# Patient Record
Sex: FEMALE | Race: WHITE | NOT HISPANIC OR LATINO | Employment: STUDENT | URBAN - METROPOLITAN AREA
[De-identification: names, ages, dates, MRNs, and addresses within clinical notes are randomized per-mention and may not be internally consistent; named-entity substitution may affect disease eponyms.]

---

## 2023-02-14 PROBLEM — J03.00 ACUTE STREPTOCOCCAL TONSILLITIS: Status: ACTIVE | Noted: 2023-02-14

## 2023-02-14 PROBLEM — N92.1 METRORRHAGIA: Status: ACTIVE | Noted: 2023-02-14

## 2023-02-14 PROBLEM — K12.0 APHTHOUS ULCER: Status: ACTIVE | Noted: 2023-02-14

## 2023-02-14 PROBLEM — N39.0 ACUTE LOWER UTI: Status: ACTIVE | Noted: 2023-02-14

## 2023-02-14 PROBLEM — J45.909 RAD (REACTIVE AIRWAY DISEASE) (HHS-HCC): Status: ACTIVE | Noted: 2023-02-14

## 2023-02-14 PROBLEM — K12.0 CANKER SORES ORAL: Status: ACTIVE | Noted: 2023-02-14

## 2023-02-14 PROBLEM — L70.9 ACNE: Status: ACTIVE | Noted: 2023-02-14

## 2023-02-14 PROBLEM — J32.9 SINUSITIS: Status: ACTIVE | Noted: 2023-02-14

## 2023-02-14 PROBLEM — J45.909 ASTHMA (HHS-HCC): Status: ACTIVE | Noted: 2023-02-14

## 2023-02-14 PROBLEM — R30.0 DYSURIA: Status: ACTIVE | Noted: 2023-02-14

## 2023-02-14 PROBLEM — J06.9 UPPER RESPIRATORY INFECTION: Status: ACTIVE | Noted: 2023-02-14

## 2023-02-14 PROBLEM — N39.0 LOWER URINARY TRACT INFECTIOUS DISEASE: Status: ACTIVE | Noted: 2023-02-14

## 2023-02-14 PROBLEM — F41.8 DEPRESSION WITH ANXIETY: Status: ACTIVE | Noted: 2023-02-14

## 2023-02-14 PROBLEM — B00.9 HERPES SIMPLEX: Status: ACTIVE | Noted: 2023-02-14

## 2023-02-14 PROBLEM — J02.9 SORETHROAT: Status: ACTIVE | Noted: 2023-02-14

## 2023-02-14 PROBLEM — J01.90 ACUTE SINUSITIS: Status: ACTIVE | Noted: 2023-02-14

## 2023-02-14 PROBLEM — L74.510 HYPERHIDROSIS OF AXILLA: Status: ACTIVE | Noted: 2023-02-14

## 2023-02-14 PROBLEM — J02.9 ACUTE PHARYNGITIS: Status: ACTIVE | Noted: 2023-02-14

## 2023-02-14 PROBLEM — T78.40XA ALLERGIC REACTION: Status: ACTIVE | Noted: 2023-02-14

## 2023-02-14 PROBLEM — R05.9 COUGH: Status: ACTIVE | Noted: 2023-02-14

## 2023-02-15 RX ORDER — METHYLPREDNISOLONE 4 MG/1
TABLET ORAL
COMMUNITY
Start: 2017-12-12 | End: 2023-03-28 | Stop reason: ALTCHOICE

## 2023-02-15 RX ORDER — VALACYCLOVIR HYDROCHLORIDE 1 G/1
1 TABLET, FILM COATED ORAL DAILY
COMMUNITY
Start: 2013-02-04 | End: 2023-03-28 | Stop reason: ALTCHOICE

## 2023-02-15 RX ORDER — ALBUTEROL SULFATE 90 UG/1
2 AEROSOL, METERED RESPIRATORY (INHALATION) EVERY 6 HOURS PRN
COMMUNITY
Start: 2020-03-03 | End: 2023-03-28 | Stop reason: ALTCHOICE

## 2023-02-15 RX ORDER — AMOXICILLIN 875 MG/1
1 TABLET, FILM COATED ORAL DAILY
COMMUNITY
Start: 2017-01-26 | End: 2023-03-28 | Stop reason: ALTCHOICE

## 2023-02-15 RX ORDER — ALBUTEROL SULFATE 90 UG/1
2 AEROSOL, METERED RESPIRATORY (INHALATION) EVERY 4 HOURS PRN
COMMUNITY
Start: 2017-12-12 | End: 2023-03-28 | Stop reason: ALTCHOICE

## 2023-02-15 RX ORDER — TRIAMCINOLONE ACETONIDE 1 MG/G
PASTE DENTAL 3 TIMES DAILY
COMMUNITY
Start: 2022-06-24 | End: 2023-03-28 | Stop reason: ALTCHOICE

## 2023-02-15 RX ORDER — LISDEXAMFETAMINE DIMESYLATE 70 MG/1
CAPSULE ORAL
COMMUNITY
Start: 2014-08-19

## 2023-02-15 RX ORDER — HYDROXYZINE PAMOATE 50 MG/1
1 CAPSULE ORAL NIGHTLY
COMMUNITY
Start: 2014-12-16

## 2023-02-15 RX ORDER — ALBUTEROL SULFATE 90 UG/1
2 AEROSOL, METERED RESPIRATORY (INHALATION) EVERY 6 HOURS PRN
COMMUNITY
Start: 2015-05-04 | End: 2023-03-28 | Stop reason: ALTCHOICE

## 2023-02-15 RX ORDER — DEXTROAMPHETAMINE SACCHARATE, AMPHETAMINE ASPARTATE, DEXTROAMPHETAMINE SULFATE AND AMPHETAMINE SULFATE 5; 5; 5; 5 MG/1; MG/1; MG/1; MG/1
TABLET ORAL
COMMUNITY
Start: 2014-05-08 | End: 2023-03-28 | Stop reason: ALTCHOICE

## 2023-02-15 RX ORDER — NITROFURANTOIN 25; 75 MG/1; MG/1
1 CAPSULE ORAL EVERY 12 HOURS
COMMUNITY
Start: 2017-02-27 | End: 2023-03-28 | Stop reason: ALTCHOICE

## 2023-02-15 RX ORDER — ALUMINUM CHLORIDE 20 %
SOLUTION, NON-ORAL TOPICAL AS NEEDED
COMMUNITY
Start: 2014-08-18 | End: 2023-03-28 | Stop reason: ALTCHOICE

## 2023-02-15 RX ORDER — AMOXICILLIN AND CLAVULANATE POTASSIUM 875; 125 MG/1; MG/1
1 TABLET, FILM COATED ORAL EVERY 12 HOURS
COMMUNITY
Start: 2017-12-12 | End: 2023-03-28 | Stop reason: ALTCHOICE

## 2023-03-28 ENCOUNTER — OFFICE VISIT (OUTPATIENT)
Dept: PRIMARY CARE | Facility: CLINIC | Age: 33
End: 2023-03-28
Payer: COMMERCIAL

## 2023-03-28 VITALS
WEIGHT: 140 LBS | HEART RATE: 114 BPM | HEIGHT: 67 IN | DIASTOLIC BLOOD PRESSURE: 81 MMHG | BODY MASS INDEX: 21.97 KG/M2 | TEMPERATURE: 98.4 F | SYSTOLIC BLOOD PRESSURE: 123 MMHG

## 2023-03-28 DIAGNOSIS — Z13.6 SCREENING FOR CARDIOVASCULAR CONDITION: ICD-10-CM

## 2023-03-28 DIAGNOSIS — J30.2 SEASONAL ALLERGIES: Primary | ICD-10-CM

## 2023-03-28 DIAGNOSIS — Z12.4 SCREENING FOR CERVICAL CANCER: ICD-10-CM

## 2023-03-28 DIAGNOSIS — Z72.0 TOBACCO USE: ICD-10-CM

## 2023-03-28 DIAGNOSIS — Z13.21 ENCOUNTER FOR VITAMIN DEFICIENCY SCREENING: ICD-10-CM

## 2023-03-28 PROBLEM — L70.9 ACNE: Status: RESOLVED | Noted: 2023-02-14 | Resolved: 2023-03-28

## 2023-03-28 PROBLEM — J02.9 SORETHROAT: Status: RESOLVED | Noted: 2023-02-14 | Resolved: 2023-03-28

## 2023-03-28 PROBLEM — T78.40XA ALLERGIC REACTION: Status: RESOLVED | Noted: 2023-02-14 | Resolved: 2023-03-28

## 2023-03-28 PROBLEM — R30.0 DYSURIA: Status: RESOLVED | Noted: 2023-02-14 | Resolved: 2023-03-28

## 2023-03-28 PROBLEM — N39.0 ACUTE LOWER UTI: Status: RESOLVED | Noted: 2023-02-14 | Resolved: 2023-03-28

## 2023-03-28 PROBLEM — J03.00 ACUTE STREPTOCOCCAL TONSILLITIS: Status: RESOLVED | Noted: 2023-02-14 | Resolved: 2023-03-28

## 2023-03-28 PROBLEM — F90.2 ADHD (ATTENTION DEFICIT HYPERACTIVITY DISORDER), COMBINED TYPE: Status: ACTIVE | Noted: 2023-03-28

## 2023-03-28 PROBLEM — K12.0 CANKER SORES ORAL: Status: RESOLVED | Noted: 2023-02-14 | Resolved: 2023-03-28

## 2023-03-28 PROBLEM — K12.0 APHTHOUS ULCER: Status: RESOLVED | Noted: 2023-02-14 | Resolved: 2023-03-28

## 2023-03-28 PROBLEM — J02.9 ACUTE PHARYNGITIS: Status: RESOLVED | Noted: 2023-02-14 | Resolved: 2023-03-28

## 2023-03-28 PROBLEM — R05.9 COUGH: Status: RESOLVED | Noted: 2023-02-14 | Resolved: 2023-03-28

## 2023-03-28 PROBLEM — N39.0 LOWER URINARY TRACT INFECTIOUS DISEASE: Status: RESOLVED | Noted: 2023-02-14 | Resolved: 2023-03-28

## 2023-03-28 PROBLEM — J01.90 ACUTE SINUSITIS: Status: RESOLVED | Noted: 2023-02-14 | Resolved: 2023-03-28

## 2023-03-28 PROCEDURE — 4004F PT TOBACCO SCREEN RCVD TLK: CPT | Performed by: INTERNAL MEDICINE

## 2023-03-28 PROCEDURE — 99203 OFFICE O/P NEW LOW 30 MIN: CPT | Performed by: INTERNAL MEDICINE

## 2023-03-28 ASSESSMENT — ENCOUNTER SYMPTOMS
POLYDIPSIA: 0
CHILLS: 0
COUGH: 0
FEVER: 0
SHORTNESS OF BREATH: 0
PALPITATIONS: 0

## 2023-05-08 ENCOUNTER — LAB (OUTPATIENT)
Dept: LAB | Facility: LAB | Age: 33
End: 2023-05-08
Payer: COMMERCIAL

## 2023-05-08 DIAGNOSIS — Z13.21 ENCOUNTER FOR VITAMIN DEFICIENCY SCREENING: ICD-10-CM

## 2023-05-08 DIAGNOSIS — Z13.6 SCREENING FOR CARDIOVASCULAR CONDITION: ICD-10-CM

## 2023-05-08 LAB
ALANINE AMINOTRANSFERASE (SGPT) (U/L) IN SER/PLAS: 12 U/L (ref 7–45)
ALBUMIN (G/DL) IN SER/PLAS: 4.5 G/DL (ref 3.4–5)
ALKALINE PHOSPHATASE (U/L) IN SER/PLAS: 58 U/L (ref 33–110)
ANION GAP IN SER/PLAS: 11 MMOL/L (ref 10–20)
ASPARTATE AMINOTRANSFERASE (SGOT) (U/L) IN SER/PLAS: 15 U/L (ref 9–39)
BILIRUBIN TOTAL (MG/DL) IN SER/PLAS: 0.5 MG/DL (ref 0–1.2)
CALCIDIOL (25 OH VITAMIN D3) (NG/ML) IN SER/PLAS: 27 NG/ML
CALCIUM (MG/DL) IN SER/PLAS: 9.8 MG/DL (ref 8.6–10.6)
CARBON DIOXIDE, TOTAL (MMOL/L) IN SER/PLAS: 29 MMOL/L (ref 21–32)
CHLORIDE (MMOL/L) IN SER/PLAS: 104 MMOL/L (ref 98–107)
CHOLESTEROL (MG/DL) IN SER/PLAS: 145 MG/DL (ref 0–199)
CHOLESTEROL IN HDL (MG/DL) IN SER/PLAS: 57.9 MG/DL
CHOLESTEROL/HDL RATIO: 2.5
CREATININE (MG/DL) IN SER/PLAS: 0.91 MG/DL (ref 0.5–1.05)
ERYTHROCYTE DISTRIBUTION WIDTH (RATIO) BY AUTOMATED COUNT: 13.1 % (ref 11.5–14.5)
ERYTHROCYTE MEAN CORPUSCULAR HEMOGLOBIN CONCENTRATION (G/DL) BY AUTOMATED: 32.6 G/DL (ref 32–36)
ERYTHROCYTE MEAN CORPUSCULAR VOLUME (FL) BY AUTOMATED COUNT: 95 FL (ref 80–100)
ERYTHROCYTES (10*6/UL) IN BLOOD BY AUTOMATED COUNT: 4.51 X10E12/L (ref 4–5.2)
GFR FEMALE: 86 ML/MIN/1.73M2
GLUCOSE (MG/DL) IN SER/PLAS: 88 MG/DL (ref 74–99)
HEMATOCRIT (%) IN BLOOD BY AUTOMATED COUNT: 42.9 % (ref 36–46)
HEMOGLOBIN (G/DL) IN BLOOD: 14 G/DL (ref 12–16)
LDL: 76 MG/DL (ref 0–99)
LEUKOCYTES (10*3/UL) IN BLOOD BY AUTOMATED COUNT: 7.3 X10E9/L (ref 4.4–11.3)
NRBC (PER 100 WBCS) BY AUTOMATED COUNT: 0 /100 WBC (ref 0–0)
PLATELETS (10*3/UL) IN BLOOD AUTOMATED COUNT: 296 X10E9/L (ref 150–450)
POTASSIUM (MMOL/L) IN SER/PLAS: 4.8 MMOL/L (ref 3.5–5.3)
PROTEIN TOTAL: 6.9 G/DL (ref 6.4–8.2)
SODIUM (MMOL/L) IN SER/PLAS: 139 MMOL/L (ref 136–145)
TRIGLYCERIDE (MG/DL) IN SER/PLAS: 54 MG/DL (ref 0–149)
UREA NITROGEN (MG/DL) IN SER/PLAS: 12 MG/DL (ref 6–23)
VLDL: 11 MG/DL (ref 0–40)

## 2023-05-08 PROCEDURE — 80061 LIPID PANEL: CPT

## 2023-05-08 PROCEDURE — 85027 COMPLETE CBC AUTOMATED: CPT

## 2023-05-08 PROCEDURE — 80053 COMPREHEN METABOLIC PANEL: CPT

## 2023-05-08 PROCEDURE — 36415 COLL VENOUS BLD VENIPUNCTURE: CPT

## 2023-05-08 PROCEDURE — 82306 VITAMIN D 25 HYDROXY: CPT

## 2024-04-30 ENCOUNTER — HOSPITAL ENCOUNTER (EMERGENCY)
Facility: HOSPITAL | Age: 34
Discharge: HOME | End: 2024-04-30
Payer: COMMERCIAL

## 2024-04-30 ENCOUNTER — APPOINTMENT (OUTPATIENT)
Dept: RADIOLOGY | Facility: HOSPITAL | Age: 34
End: 2024-04-30
Payer: COMMERCIAL

## 2024-04-30 VITALS
RESPIRATION RATE: 20 BRPM | WEIGHT: 170 LBS | BODY MASS INDEX: 28.32 KG/M2 | SYSTOLIC BLOOD PRESSURE: 118 MMHG | DIASTOLIC BLOOD PRESSURE: 69 MMHG | HEIGHT: 65 IN | OXYGEN SATURATION: 100 % | TEMPERATURE: 98.2 F | HEART RATE: 65 BPM

## 2024-04-30 DIAGNOSIS — O20.0 THREATENED ABORTION (HHS-HCC): Primary | ICD-10-CM

## 2024-04-30 LAB
ABO GROUP (TYPE) IN BLOOD: NORMAL
ALBUMIN SERPL-MCNC: 4.7 G/DL (ref 3.5–5)
ALP BLD-CCNC: 79 U/L (ref 35–125)
ALT SERPL-CCNC: 17 U/L (ref 5–40)
ANION GAP SERPL CALC-SCNC: 11 MMOL/L
APPEARANCE UR: ABNORMAL
AST SERPL-CCNC: 17 U/L (ref 5–40)
BASOPHILS # BLD AUTO: 0.03 X10*3/UL (ref 0–0.1)
BASOPHILS NFR BLD AUTO: 0.3 %
BILIRUB SERPL-MCNC: 0.2 MG/DL (ref 0.1–1.2)
BILIRUB UR STRIP.AUTO-MCNC: NEGATIVE MG/DL
BUN SERPL-MCNC: 13 MG/DL (ref 8–25)
CALCIUM SERPL-MCNC: 9.4 MG/DL (ref 8.5–10.4)
CHLORIDE SERPL-SCNC: 101 MMOL/L (ref 97–107)
CO2 SERPL-SCNC: 27 MMOL/L (ref 24–31)
COLOR UR: ABNORMAL
CREAT SERPL-MCNC: 0.9 MG/DL (ref 0.4–1.6)
EGFRCR SERPLBLD CKD-EPI 2021: 87 ML/MIN/1.73M*2
EOSINOPHIL # BLD AUTO: 0.06 X10*3/UL (ref 0–0.7)
EOSINOPHIL NFR BLD AUTO: 0.7 %
ERYTHROCYTE [DISTWIDTH] IN BLOOD BY AUTOMATED COUNT: 13.2 % (ref 11.5–14.5)
GLUCOSE SERPL-MCNC: 122 MG/DL (ref 65–99)
GLUCOSE UR STRIP.AUTO-MCNC: NORMAL MG/DL
HCG SERPL-ACNC: 213 MIU/ML
HCT VFR BLD AUTO: 41.6 % (ref 36–46)
HGB BLD-MCNC: 13.8 G/DL (ref 12–16)
IMM GRANULOCYTES # BLD AUTO: 0.03 X10*3/UL (ref 0–0.7)
IMM GRANULOCYTES NFR BLD AUTO: 0.3 % (ref 0–0.9)
KETONES UR STRIP.AUTO-MCNC: NEGATIVE MG/DL
LEUKOCYTE ESTERASE UR QL STRIP.AUTO: ABNORMAL
LYMPHOCYTES # BLD AUTO: 2.22 X10*3/UL (ref 1.2–4.8)
LYMPHOCYTES NFR BLD AUTO: 24.4 %
MCH RBC QN AUTO: 31.2 PG (ref 26–34)
MCHC RBC AUTO-ENTMCNC: 33.2 G/DL (ref 32–36)
MCV RBC AUTO: 94 FL (ref 80–100)
MONOCYTES # BLD AUTO: 0.71 X10*3/UL (ref 0.1–1)
MONOCYTES NFR BLD AUTO: 7.8 %
NEUTROPHILS # BLD AUTO: 6.03 X10*3/UL (ref 1.2–7.7)
NEUTROPHILS NFR BLD AUTO: 66.5 %
NITRITE UR QL STRIP.AUTO: NEGATIVE
NRBC BLD-RTO: 0 /100 WBCS (ref 0–0)
PH UR STRIP.AUTO: 5.5 [PH]
PLATELET # BLD AUTO: 308 X10*3/UL (ref 150–450)
POTASSIUM SERPL-SCNC: 4.1 MMOL/L (ref 3.4–5.1)
PROT SERPL-MCNC: 7.5 G/DL (ref 5.9–7.9)
PROT UR STRIP.AUTO-MCNC: ABNORMAL MG/DL
RBC # BLD AUTO: 4.42 X10*6/UL (ref 4–5.2)
RBC # UR STRIP.AUTO: ABNORMAL /UL
RBC #/AREA URNS AUTO: >20 /HPF
RH FACTOR (ANTIGEN D): NORMAL
SODIUM SERPL-SCNC: 139 MMOL/L (ref 133–145)
SP GR UR STRIP.AUTO: 1.02
UROBILINOGEN UR STRIP.AUTO-MCNC: NORMAL MG/DL
WBC # BLD AUTO: 9.1 X10*3/UL (ref 4.4–11.3)
WBC #/AREA URNS AUTO: ABNORMAL /HPF

## 2024-04-30 PROCEDURE — 80053 COMPREHEN METABOLIC PANEL: CPT | Performed by: NURSE PRACTITIONER

## 2024-04-30 PROCEDURE — 76817 TRANSVAGINAL US OBSTETRIC: CPT

## 2024-04-30 PROCEDURE — 76817 TRANSVAGINAL US OBSTETRIC: CPT | Performed by: RADIOLOGY

## 2024-04-30 PROCEDURE — 36415 COLL VENOUS BLD VENIPUNCTURE: CPT | Performed by: NURSE PRACTITIONER

## 2024-04-30 PROCEDURE — 85025 COMPLETE CBC W/AUTO DIFF WBC: CPT | Performed by: NURSE PRACTITIONER

## 2024-04-30 PROCEDURE — 84702 CHORIONIC GONADOTROPIN TEST: CPT | Performed by: NURSE PRACTITIONER

## 2024-04-30 PROCEDURE — 76801 OB US < 14 WKS SINGLE FETUS: CPT

## 2024-04-30 PROCEDURE — 81001 URINALYSIS AUTO W/SCOPE: CPT | Performed by: NURSE PRACTITIONER

## 2024-04-30 PROCEDURE — 76815 OB US LIMITED FETUS(S): CPT | Performed by: RADIOLOGY

## 2024-04-30 PROCEDURE — 86901 BLOOD TYPING SEROLOGIC RH(D): CPT | Performed by: NURSE PRACTITIONER

## 2024-04-30 PROCEDURE — 99284 EMERGENCY DEPT VISIT MOD MDM: CPT | Mod: 25

## 2024-04-30 ASSESSMENT — LIFESTYLE VARIABLES
EVER FELT BAD OR GUILTY ABOUT YOUR DRINKING: NO
EVER HAD A DRINK FIRST THING IN THE MORNING TO STEADY YOUR NERVES TO GET RID OF A HANGOVER: NO
TOTAL SCORE: 0
HAVE YOU EVER FELT YOU SHOULD CUT DOWN ON YOUR DRINKING: NO
HAVE PEOPLE ANNOYED YOU BY CRITICIZING YOUR DRINKING: NO

## 2024-04-30 ASSESSMENT — PAIN - FUNCTIONAL ASSESSMENT
PAIN_FUNCTIONAL_ASSESSMENT: 0-10
PAIN_FUNCTIONAL_ASSESSMENT: 0-10

## 2024-04-30 ASSESSMENT — COLUMBIA-SUICIDE SEVERITY RATING SCALE - C-SSRS
2. HAVE YOU ACTUALLY HAD ANY THOUGHTS OF KILLING YOURSELF?: NO
6. HAVE YOU EVER DONE ANYTHING, STARTED TO DO ANYTHING, OR PREPARED TO DO ANYTHING TO END YOUR LIFE?: NO
1. IN THE PAST MONTH, HAVE YOU WISHED YOU WERE DEAD OR WISHED YOU COULD GO TO SLEEP AND NOT WAKE UP?: NO

## 2024-04-30 ASSESSMENT — PAIN SCALES - GENERAL
PAINLEVEL_OUTOF10: 3
PAINLEVEL_OUTOF10: 2

## 2024-04-30 ASSESSMENT — PAIN DESCRIPTION - PROGRESSION: CLINICAL_PROGRESSION: RAPIDLY IMPROVING

## 2024-04-30 ASSESSMENT — PAIN DESCRIPTION - LOCATION: LOCATION: ABDOMEN

## 2024-04-30 ASSESSMENT — PAIN DESCRIPTION - ONSET: ONSET: ONGOING

## 2024-04-30 ASSESSMENT — PAIN DESCRIPTION - DESCRIPTORS: DESCRIPTORS: CRAMPING

## 2024-04-30 ASSESSMENT — PAIN DESCRIPTION - PAIN TYPE: TYPE: ACUTE PAIN

## 2024-04-30 NOTE — ED PROVIDER NOTES
HPI   Chief Complaint   Patient presents with    Vaginal Bleeding     Low abd pain       HPI  See my MDM                  [place] Coma Scale Score: 15                     Patient History   Past Medical History:   Diagnosis Date    Other conditions influencing health status     Urinary Tract Infection    Other conditions influencing health status     Urinary Tract Infection    Personal history of other diseases of the musculoskeletal system and connective tissue     History of low back pain     No past surgical history on file.  Family History   Problem Relation Name Age of Onset    Other (small cell lung cancer) Mother      Depression Father      Cancer Other Grandmother     Breast cancer Other Aunt     Other (cardiac disorder) Other Grandfather     Heart attack Other Grandfather     Colon cancer Neg Hx       Social History     Tobacco Use    Smoking status: Every Day     Types: Cigarettes     Passive exposure: Never    Smokeless tobacco: Never    Tobacco comments:     Pre-contemplative.    Substance Use Topics    Alcohol use: Yes     Comment: ocassionally    Drug use: Never       Physical Exam   ED Triage Vitals [04/30/24 1507]   Temperature Heart Rate Respirations BP   36.8 °C (98.2 °F) 94 18 157/82      Pulse Ox Temp src Heart Rate Source Patient Position   (!) 10 % -- -- --      BP Location FiO2 (%)     -- --       Physical Exam  CONSTITUTIONAL: Vital signs reviewed as charted, well-developed and in no distress  Eyes: Extraocular muscles are intact. Pupils equal round and reactive to light. Conjunctiva are pink.    ENT: Mucous membranes are moist. Tongue in the midline. Pharynx was without erythema or exudates, uvula midline  LUNGS: Breath sounds equal and clear to auscultation. Good air exchange, no wheezes rales or retractions, pulse oximetry is charted.  HEART: Regular rate and rhythm without murmur thrill or rub, strong tones, auscultation is normal.  ABDOMEN: Mild tenderness in the lower abdomen/pelvis.  No  "rebound or guarding noted.  Abdomen soft  Neuro: The patient is awake, alert and oriented ×3. Moving all 4 extremities and answering questions appropriately.   MUSCULOSKELETAL: The calves are nontender to palpation. Full gross active range of motion.   PSYCH: Awake alert oriented, normal mood and affect.  Skin:  Dry, normal color, warm to the touch, no rash present.      ED Course & MDM   Diagnoses as of 24   Threatened  ([facility])       Medical Decision Making  History obtained from: patient    Vital signs, nursing notes, current medications, past medical history, Surgical history, allergies, social history, family History were reviewed.         HPI:  Patient 33-year-old female presenting emergency room today approximately 5 weeks pregnant evaluation of lower pelvic cramping and bleeding.  She had a hCG quant of 179 6 days ago.  States this all started earlier today.  No fever chills or night sweats.  No nausea vomiting diarrhea.  Nontoxic well-appearing patient states she has blood through.  1.5 pads today.  States it may be a little bit heavier than her normal menstrual cycle.      10 point ROS was reviewed and negative except Noted above in HPI.  DDX: as listed above          MDM Summary/considerations:  EMERGENCY DEPARTMENT COURSE and DIFFERENTIAL DIAGNOSIS/MDM:        The patient presented with a chief complaint of positive pregnancy, vaginal bleeding. The differential diagnosis associated with this patient's presentation includes spontaneous , vaginal bleeding during early pregnancy, UTI.       Vitals:    Vitals:    24 1507 24 1821   BP: 157/82    Pulse: 94    Resp: 18    Temp: 36.8 °C (98.2 °F)    SpO2: (!) 10% 100%   Weight: 77.1 kg (170 lb)    Height: 1.651 m (5' 5\")        Diagnoses as of 24   Threatened  ([facility])       History Limited by:    None    Independent history obtained from:    None      External records reviewed:    None      Diagnostics " interpreted by me:    Ultrasound(s) of the pelvis      Discussions with other clinicians:    None      Chronic conditions impacting care:    None      Social determinants of health affecting care:    None    Diagnostic tests considered but not performed: none    ED Medications managed:    Medications - No data to display      Prescription drugs considered:    None    Labs Reviewed   COMPREHENSIVE METABOLIC PANEL - Abnormal       Result Value    Glucose 122 (*)     Sodium 139      Potassium 4.1      Chloride 101      Bicarbonate 27      Urea Nitrogen 13      Creatinine 0.90      eGFR 87      Calcium 9.4      Albumin 4.7      Alkaline Phosphatase 79      Total Protein 7.5      AST 17      Bilirubin, Total 0.2      ALT 17      Anion Gap 11     URINALYSIS WITH REFLEX MICROSCOPIC - Abnormal    Color, Urine Brown (*)     Appearance, Urine Ex.Turbid (*)     Specific Gravity, Urine 1.017      pH, Urine 5.5      Protein, Urine 50 (1+) (*)     Glucose, Urine Normal      Blood, Urine OVER (3+) (*)     Ketones, Urine NEGATIVE      Bilirubin, Urine NEGATIVE      Urobilinogen, Urine Normal      Nitrite, Urine NEGATIVE      Leukocyte Esterase, Urine 75 Fernando/µL (*)    MICROSCOPIC ONLY, URINE - Abnormal    WBC, Urine 1-5      RBC, Urine >20 (*)    CBC WITH AUTO DIFFERENTIAL    WBC 9.1      nRBC 0.0      RBC 4.42      Hemoglobin 13.8      Hematocrit 41.6      MCV 94      MCH 31.2      MCHC 33.2      RDW 13.2      Platelets 308      Neutrophils % 66.5      Immature Granulocytes %, Automated 0.3      Lymphocytes % 24.4      Monocytes % 7.8      Eosinophils % 0.7      Basophils % 0.3      Neutrophils Absolute 6.03      Immature Granulocytes Absolute, Automated 0.03      Lymphocytes Absolute 2.22      Monocytes Absolute 0.71      Eosinophils Absolute 0.06      Basophils Absolute 0.03     HUMAN CHORIONIC GONADOTROPIN, SERUM QUANTITATIVE    HCG, Beta-Quantitative 213     ABORH    ABO TYPE O      Rh TYPE POS       US PELVIS OB TRANSABDOMINAL  W TRANSVAGINAL UP TO 1ST TRIMESTER   Final Result   An intrauterine gestational sac is not identified. Given beta HCG of   213 mIU/mL, this is below level of detection by sonography. Findings   represent a pregnancy of unknown location. Differential   considerations include: early nonvisualized intrauterine pregnancy,   failed intrauterine pregnancy, nonvisualized occult ectopic   pregnancy. Follow-up serial beta HCG and pelvic ultrasound   recommended to accurately assess pregnancy status.        Nonvisualization of the left ovary.        MACRO:   None        Signed by: Maria Ines Oswald 4/30/2024 6:22 PM   Dictation workstation:   [id number]        Medications - No data to display  New Prescriptions    No medications on file     I estimate there is a low risk for acute appendicitis, polyps correction, cholecystitis, diverticulitis, incarcerated hernia, mesenteric ischemia, pancreatitis, pelvic inflammatory disease, perforated bowel or ulcer, ectopic pregnancy, or ovarian abscess, thus I consider the discharge disposition reasonable. Also there is no evidence of peritonitis, sepsis, or toxicity. We have discussed the diagnosis and risks, and we agree with discharging home to follow-up with her primary care doctor. We have discussed returning to the emergency department immediately if new or worsening symptoms occur. We have discussed the symptoms which are most concerning such as bloody stool, fever, changing or worsening pain, or vomiting necessitates immediate return.      Patient's hCG quantitative today was 213, 6 days ago was 179 likely indicate patient is probably miscarrying at this time.  Ultrasound showed nothing definite.  She will be discharged home stable condition have highly recommended following with either her PCP or GYN in 2 days for repeat hCG quantitative.  Was discharged home stable condition.    All of the patient's questions were answered to the best of my ability.  Patient states understanding  that they have been screened for an emergency today and we have not found any etiology of symptoms that requires emergent treatment or admission to the hospital at this point. They understand that they have not had definitive care day and require follow-up for treatment of their condition. They also state understanding that they may have an emergent condition that may potentially have not of detected at this visit and they must return to the emergency department if they develop any worsening of symptoms or new complaints.      Discussed H&P with supervising physician (Ryne), aware of results and agrees with plan/ disposition.        Critical Care: Not warranted at this time        This chart was completed using voice recognition transcription software. Please excuse any errors of transcription including grammatical, punctuation, syntax and spelling errors.  Please contact me with any questions regarding this chart.    Procedure  Procedures     USHA Willams-MANNY  04/30/24 3136

## 2024-05-02 ENCOUNTER — LAB (OUTPATIENT)
Dept: LAB | Facility: LAB | Age: 34
End: 2024-05-02
Payer: COMMERCIAL

## 2024-05-02 ENCOUNTER — TELEPHONE (OUTPATIENT)
Dept: PRIMARY CARE | Facility: CLINIC | Age: 34
End: 2024-05-02
Payer: COMMERCIAL

## 2024-05-02 DIAGNOSIS — O20.0 THREATENED ABORTION (HHS-HCC): ICD-10-CM

## 2024-05-02 DIAGNOSIS — O20.0 THREATENED ABORTION (HHS-HCC): Primary | ICD-10-CM

## 2024-05-02 LAB — B-HCG SERPL-ACNC: 59 MIU/ML

## 2024-05-02 PROCEDURE — 84702 CHORIONIC GONADOTROPIN TEST: CPT

## 2024-05-02 PROCEDURE — 36415 COLL VENOUS BLD VENIPUNCTURE: CPT

## 2024-05-02 NOTE — TELEPHONE ENCOUNTER
Pt was in the ER two days ago and was advised to call the office to have orders placed for repeat HCG testing. I can give her a call once it's in so she can go to the lab.

## 2025-05-04 DIAGNOSIS — Z13.1 SCREENING FOR DIABETES MELLITUS: ICD-10-CM

## 2025-05-04 DIAGNOSIS — Z00.00 ROUTINE GENERAL MEDICAL EXAMINATION AT A HEALTH CARE FACILITY: ICD-10-CM

## 2025-05-04 DIAGNOSIS — E55.9 VITAMIN D DEFICIENCY: ICD-10-CM

## 2025-05-04 DIAGNOSIS — Z13.6 SCREENING FOR CARDIOVASCULAR CONDITION: Primary | ICD-10-CM

## 2025-05-13 LAB
25(OH)D3+25(OH)D2 SERPL-MCNC: 34 NG/ML (ref 30–100)
ALBUMIN SERPL-MCNC: 4.4 G/DL (ref 3.6–5.1)
ALP SERPL-CCNC: 53 U/L (ref 31–125)
ALT SERPL-CCNC: 12 U/L (ref 6–29)
ANION GAP SERPL CALCULATED.4IONS-SCNC: 5 MMOL/L (CALC) (ref 7–17)
AST SERPL-CCNC: 15 U/L (ref 10–30)
BILIRUB SERPL-MCNC: 0.3 MG/DL (ref 0.2–1.2)
BUN SERPL-MCNC: 18 MG/DL (ref 7–25)
CALCIUM SERPL-MCNC: 9.5 MG/DL (ref 8.6–10.2)
CHLORIDE SERPL-SCNC: 104 MMOL/L (ref 98–110)
CHOLEST SERPL-MCNC: 157 MG/DL
CHOLEST/HDLC SERPL: 2.4 (CALC)
CO2 SERPL-SCNC: 29 MMOL/L (ref 20–32)
CREAT SERPL-MCNC: 0.79 MG/DL (ref 0.5–0.97)
EGFRCR SERPLBLD CKD-EPI 2021: 101 ML/MIN/1.73M2
ERYTHROCYTE [DISTWIDTH] IN BLOOD BY AUTOMATED COUNT: 13.1 % (ref 11–15)
EST. AVERAGE GLUCOSE BLD GHB EST-MCNC: 105 MG/DL
EST. AVERAGE GLUCOSE BLD GHB EST-SCNC: 5.8 MMOL/L
GLUCOSE SERPL-MCNC: 91 MG/DL (ref 65–99)
HBA1C MFR BLD: 5.3 %
HCT VFR BLD AUTO: 42 % (ref 35–45)
HDLC SERPL-MCNC: 65 MG/DL
HGB BLD-MCNC: 13.1 G/DL (ref 11.7–15.5)
LDLC SERPL CALC-MCNC: 78 MG/DL (CALC)
MCH RBC QN AUTO: 30.8 PG (ref 27–33)
MCHC RBC AUTO-ENTMCNC: 31.2 G/DL (ref 32–36)
MCV RBC AUTO: 98.6 FL (ref 80–100)
NONHDLC SERPL-MCNC: 92 MG/DL (CALC)
PLATELET # BLD AUTO: 289 THOUSAND/UL (ref 140–400)
PMV BLD REES-ECKER: 10.6 FL (ref 7.5–12.5)
POTASSIUM SERPL-SCNC: 4.5 MMOL/L (ref 3.5–5.3)
PROT SERPL-MCNC: 6.6 G/DL (ref 6.1–8.1)
RBC # BLD AUTO: 4.26 MILLION/UL (ref 3.8–5.1)
SODIUM SERPL-SCNC: 138 MMOL/L (ref 135–146)
TRIGL SERPL-MCNC: 65 MG/DL
WBC # BLD AUTO: 6 THOUSAND/UL (ref 3.8–10.8)

## 2025-07-10 ENCOUNTER — OFFICE VISIT (OUTPATIENT)
Dept: URGENT CARE | Age: 35
End: 2025-07-10
Payer: COMMERCIAL

## 2025-07-10 VITALS
RESPIRATION RATE: 16 BRPM | TEMPERATURE: 98.1 F | OXYGEN SATURATION: 98 % | SYSTOLIC BLOOD PRESSURE: 126 MMHG | WEIGHT: 170 LBS | DIASTOLIC BLOOD PRESSURE: 82 MMHG | BODY MASS INDEX: 28.29 KG/M2 | HEART RATE: 108 BPM

## 2025-07-10 DIAGNOSIS — J02.9 SORE THROAT: Primary | ICD-10-CM

## 2025-07-10 LAB
POC HUMAN RHINOVIRUS PCR: NEGATIVE
POC INFLUENZA A VIRUS PCR: NEGATIVE
POC INFLUENZA B VIRUS PCR: NEGATIVE
POC RESPIRATORY SYNCYTIAL VIRUS PCR: NEGATIVE
POC SARS-COV-2 AG BINAX: NORMAL
POC STREPTOCOCCUS PYOGENES (GROUP A STREP) PCR: NEGATIVE

## 2025-07-10 RX ORDER — ALBUTEROL SULFATE 90 UG/1
INHALANT RESPIRATORY (INHALATION)
COMMUNITY
Start: 2025-05-28

## 2025-07-10 NOTE — PROGRESS NOTES
Subjective   Patient ID: Anne Gill is a 35 y.o. female. They present today with a chief complaint of Sore Throat (Patient has very sore throat hurts to swallow congested, for 3 days ).    History of Present Illness  See mdm       History provided by:  Patient   used: No        Past Medical History  Allergies as of 07/10/2025    (No Known Allergies)       Prescriptions Prior to Admission[1]     Medical History[2]    Surgical History[3]     reports that she has quit smoking. Her smoking use included cigarettes. She has never been exposed to tobacco smoke. She uses smokeless tobacco. She reports current alcohol use. She reports that she does not use drugs.    Review of Systems  Review of Systems   All other systems reviewed and are negative.                                 Objective    Vitals:    07/10/25 0857   BP: 126/82   BP Location: Left arm   Patient Position: Sitting   BP Cuff Size: Adult   Pulse: 108   Resp: 16   Temp: 36.7 °C (98.1 °F)   TempSrc: Oral   SpO2: 98%   Weight: 77.1 kg (170 lb)     Patient's last menstrual period was 06/20/2025 (approximate).    Physical Exam  Vitals and nursing note reviewed.   Constitutional:       General: She is not in acute distress.     Appearance: Normal appearance. She is normal weight. She is not ill-appearing, toxic-appearing or diaphoretic.      Comments: Well appearing and pleasant    HENT:      Head: Normocephalic and atraumatic.      Right Ear: Tympanic membrane, ear canal and external ear normal. There is no impacted cerumen.      Left Ear: Tympanic membrane, ear canal and external ear normal. There is no impacted cerumen.      Nose: Congestion present.      Mouth/Throat:      Mouth: Mucous membranes are moist.      Pharynx: Posterior oropharyngeal erythema present. No oropharyngeal exudate.      Comments: Oropharynx is widely patent.  Mucous membranes are moist.  Mild erythema of posterior pharynx without exudate, edema or asymmetry of  posterior pharynx or tonsils.  Uvula is midline and without edema.  No muffled voice or trismus.   Eyes:      General: No scleral icterus.        Right eye: No discharge.         Left eye: No discharge.      Extraocular Movements: Extraocular movements intact.      Conjunctiva/sclera: Conjunctivae normal.      Pupils: Pupils are equal, round, and reactive to light.   Cardiovascular:      Rate and Rhythm: Normal rate and regular rhythm.      Pulses: Normal pulses.      Heart sounds: Normal heart sounds. No murmur heard.     No friction rub. No gallop.   Pulmonary:      Effort: Pulmonary effort is normal. No respiratory distress.      Breath sounds: No wheezing, rhonchi or rales.   Abdominal:      General: Abdomen is flat.   Musculoskeletal:         General: Normal range of motion.      Cervical back: Normal range of motion and neck supple. No rigidity or tenderness.   Lymphadenopathy:      Cervical: No cervical adenopathy.   Skin:     General: Skin is warm and dry.      Capillary Refill: Capillary refill takes less than 2 seconds.      Coloration: Skin is not jaundiced or pale.      Findings: No rash.   Neurological:      General: No focal deficit present.      Mental Status: She is alert.      Gait: Gait normal.   Psychiatric:         Mood and Affect: Mood normal.         Behavior: Behavior normal.         Procedures    Point of Care Test & Imaging Results from this visit  Results for orders placed or performed in visit on 07/10/25   POCT SPOTFIRE R/ST Panel Mini w/Strep A ([facility]) manually resulted   Result Value Ref Range    POC Group A Strep, PCR Negative Negative    POC Respiratory Syncytial Virus PCR Negative Negative    POC Influenza A Virus PCR Negative Negative    POC Influenza B Virus PCR Negative Negative    POC Human Rhinovirus PCR Negative Negative   POCT Covid-19 Rapid Antigen   Result Value Ref Range    POC MIKE-COV-2 AG  Presumptive negative test for SARS-CoV-2 (no antigen detected)     Presumptive  negative test for SARS-CoV-2 (no antigen detected)      Imaging  No results found.    Cardiology, Vascular, and Other Imaging  No other imaging results found for the past 2 days      Diagnostic study results (if any) were reviewed by Cheryl Garcia PA-C.    Assessment/Plan   Allergies, medications, history, and pertinent labs/EKGs/Imaging reviewed by Cheryl Garcia PA-C.     Medical Decision Making  35 year old F presents with complaint of sore throat, nasal congestion, subjective fever and chills ongoing for the past 3 days.  She has been taking OTC cold and flu with improvement.  History of asthma although has an inhaler at home and is not feeling like asthma is flared up.  No cough, wheezing, SOB, CP.  No red flags for PTA, epiglottits, RAD, deep space infection of neck or other emergency.  Viral and strep testing neg.  Suspect viral pharyngitis.  Discussed symptomatic control.  Encouraged follow-up with primary care provider.  Discussed expected course, indications for return or for presentation to emergency department.  Discharged good condition agreeable to plan as discussed.      Orders and Diagnoses  Diagnoses and all orders for this visit:  Sore throat  -     POCT SPOTFIRE R/ST Panel Mini w/Strep A ([facility]) manually resulted  -     POCT Covid-19 Rapid Antigen      Medical Admin Record      Patient disposition: Home    Electronically signed by Cheryl Garcia PA-C  9:45 AM           [1] (Not in a hospital admission)   [2]   Past Medical History:  Diagnosis Date    Other conditions influencing health status     Urinary Tract Infection    Other conditions influencing health status     Urinary Tract Infection    Personal history of other diseases of the musculoskeletal system and connective tissue     History of low back pain   [3] No past surgical history on file.

## 2025-07-10 NOTE — PATIENT INSTRUCTIONS
Covid, strep, flu, RSV and rhinovirus as negative.  Your symptoms are thought to be viral.  Continue over the counter cold and flu medications, lozenges, flonase nasal spray for symptoms.  Return for recheck for new or worsening symptoms.

## 2025-07-10 NOTE — LETTER
July 10, 2025     Patient: Anne Gill   YOB: 1990   Date of Visit: 7/10/2025       To Whom It May Concern:    Anne Gill was seen in my clinic on 7/10/2025 at 9:00 am. Please excuse Anne for her absence from work on this day to make the appointment. May return back Monday 7/14/2025    If you have any questions or concerns, please don't hesitate to call.         Sincerely,         Cheryl Garcia PA-C        CC: No Recipients